# Patient Record
Sex: MALE | Race: WHITE | Employment: FULL TIME | URBAN - METROPOLITAN AREA
[De-identification: names, ages, dates, MRNs, and addresses within clinical notes are randomized per-mention and may not be internally consistent; named-entity substitution may affect disease eponyms.]

---

## 2017-01-23 ENCOUNTER — OFFICE VISIT (OUTPATIENT)
Dept: SURGERY | Facility: CLINIC | Age: 67
End: 2017-01-23

## 2017-01-23 VITALS
HEART RATE: 78 BPM | BODY MASS INDEX: 25.71 KG/M2 | HEIGHT: 66 IN | WEIGHT: 160 LBS | DIASTOLIC BLOOD PRESSURE: 77 MMHG | SYSTOLIC BLOOD PRESSURE: 177 MMHG | RESPIRATION RATE: 16 BRPM | TEMPERATURE: 98 F

## 2017-01-23 DIAGNOSIS — K60.1 CHRONIC POSTERIOR ANAL FISSURE: Primary | ICD-10-CM

## 2017-01-23 DIAGNOSIS — D48.5: ICD-10-CM

## 2017-01-23 DIAGNOSIS — L29.0 PRURITUS ANI: ICD-10-CM

## 2017-01-23 PROCEDURE — 99213 OFFICE O/P EST LOW 20 MIN: CPT | Performed by: COLON & RECTAL SURGERY

## 2017-01-23 NOTE — PATIENT INSTRUCTIONS
At this patient's last visit he was diagnosed with a posterior midline anal fissure. It had significant scarring and deformity. He has had a previous left anterolateral complex anal fistulotomy performed in 2005.     He had significant leukoplakia over th has no history of previous polyps. He has no history of colon cancer in his first-degree or second-degree relatives. We put him back on the screening protocol for January 2023.

## 2020-10-06 ENCOUNTER — OFFICE VISIT (OUTPATIENT)
Dept: SURGERY | Facility: CLINIC | Age: 70
End: 2020-10-06
Payer: COMMERCIAL

## 2020-10-06 VITALS — TEMPERATURE: 99 F | SYSTOLIC BLOOD PRESSURE: 116 MMHG | DIASTOLIC BLOOD PRESSURE: 72 MMHG | HEART RATE: 68 BPM

## 2020-10-06 DIAGNOSIS — K57.90 DIVERTICULOSIS: ICD-10-CM

## 2020-10-06 DIAGNOSIS — K92.2 INTESTINAL BLEEDING: Primary | ICD-10-CM

## 2020-10-06 DIAGNOSIS — L29.0 PRURITUS ANI: ICD-10-CM

## 2020-10-06 PROCEDURE — 99203 OFFICE O/P NEW LOW 30 MIN: CPT | Performed by: COLON & RECTAL SURGERY

## 2020-10-06 PROCEDURE — 3078F DIAST BP <80 MM HG: CPT | Performed by: COLON & RECTAL SURGERY

## 2020-10-06 PROCEDURE — 3074F SYST BP LT 130 MM HG: CPT | Performed by: COLON & RECTAL SURGERY

## 2020-10-06 RX ORDER — POLYETHYLENE GLYCOL 3350, SODIUM CHLORIDE, SODIUM BICARBONATE, POTASSIUM CHLORIDE 420; 11.2; 5.72; 1.48 G/4L; G/4L; G/4L; G/4L
POWDER, FOR SOLUTION ORAL
Qty: 1 BOTTLE | Refills: 0 | Status: SHIPPED | OUTPATIENT
Start: 2020-10-06

## 2020-10-06 NOTE — H&P
New Patient Visit Note       Active Problems      1. Intestinal bleeding    2. Pruritus ani        Chief Complaint   Patient presents with: Intestinal Bleeding -- last cscope 10/2/2001, denies polyps, only some divertic areas. Denies any rectal pain.  Stat the PA  I performed my own physical exam and obtained the history as detailed above.   I have made all appropriate changes in documentation as necessary  I attest to the accuracy of this note as detailed above    Mick Vogt MD [facility] [address]    My total dysuria, frequency and urgency. Musculoskeletal: Negative for arthralgias and myalgias. Skin: Negative for color change and rash. Neurological: Negative for tremors, syncope and weakness. Hematological: Negative for adenopathy.  Does not bruise/blee Neurological: He is alert and oriented to person, place, and time. Skin: Skin is warm and dry. No rash noted. He is not diaphoretic. Psychiatric: He has a normal mood and affect.  His behavior is normal. Judgment and thought content normal.   Nursing are no palpable masses or hernias. It was discussed with the patient that secondary to his intestinal bleeding, and the fact that it has been 19 years since his last colonoscopy that we do need to repeat a colonoscopy at this time.   It was discussed roxanna

## 2020-10-06 NOTE — PATIENT INSTRUCTIONS
The patient presents today in consultation of Dr. Natalya Majano for intestinal bleeding and colonoscopy. The patient states that his last colonoscopy was in 2001, and was finally told that he needed a repeat colonoscopy.     The patient does state that he has colonoscopy sooner. Will be scheduled patient undergo a colon[facility] at [facility]. All risks, benefits, complications and alternatives to the proposed operation were fully discussed with the patient.  All questions

## 2020-10-31 ENCOUNTER — APPOINTMENT (OUTPATIENT)
Dept: LAB | Facility: HOSPITAL | Age: 70
End: 2020-10-31
Attending: COLON & RECTAL SURGERY
Payer: MEDICARE

## 2020-10-31 DIAGNOSIS — K92.2 INTESTINAL BLEEDING: ICD-10-CM

## 2020-11-23 ENCOUNTER — PATIENT OUTREACH (OUTPATIENT)
Dept: SURGERY | Facility: CLINIC | Age: 70
End: 2020-11-23

## (undated) NOTE — LETTER
10/06/20    Patient: Kathy Pennington  : 1/10/1950 Visit date: 10/6/2020    Dear  Lord Zuñiga    Thank you for referring Kathy Pennington to my practice. Please find my assessment and plan below.        Assessment   Intestinal bleeding  (primary It was discussed with the patient that secondary to his intestinal bleeding, and the fact that it has been 19 years since his last colonoscopy that we do need to repeat a colonoscopy at this time.   It was discussed that he will need another colonoscopy in

## (undated) NOTE — MR AVS SNAPSHOT
[facility]  [address] [address] [phone]               Thank you for choosing us for your health care visit with Arlene Hilario MD.  We are glad to serve you and happy to provide you with this summary of you the fissure. There is no evidence of skin cancer or squamous cell cancer. The prostate remains normal.  There is no evidence of Crohn's disease or colitis. There are no fistula identified at today's examination.     This region has healed greater than 90 not sign up before the expiration date, you must request a new code. Your unique [employer] Access Code: Colten [email or web address]  Expires: 2/14/2017  4:11 PM    If you have questions, you can call [phone] to talk to our [employer].  Remember, [employer]

## (undated) NOTE — Clinical Note
2017    Patient: Jhonny Valenzuela  : 1/10/1950 Visit date: 2017    Dear  Dr. Brendan Carmona,    Thank you for referring Jhonny Valenzuela to my practice. Please find my assessment and plan below.         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 It has greatly improved on therapy. We will see this patient on an as-needed basis.   If he has ongoing symptoms, escalating symptoms, or the feeling of a new lump or bump in the anal canal, he should return to my attention i